# Patient Record
Sex: MALE | Race: WHITE
[De-identification: names, ages, dates, MRNs, and addresses within clinical notes are randomized per-mention and may not be internally consistent; named-entity substitution may affect disease eponyms.]

---
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
## 2023-10-25 ENCOUNTER — EMERGENCY (EMERGENCY)
Facility: HOSPITAL | Age: 18
LOS: 0 days | Discharge: HOME | End: 2023-10-25
Attending: STUDENT IN AN ORGANIZED HEALTH CARE EDUCATION/TRAINING PROGRAM
Payer: COMMERCIAL

## 2023-10-25 VITALS
DIASTOLIC BLOOD PRESSURE: 65 MMHG | WEIGHT: 235.01 LBS | HEART RATE: 93 BPM | SYSTOLIC BLOOD PRESSURE: 114 MMHG | OXYGEN SATURATION: 96 % | RESPIRATION RATE: 18 BRPM | TEMPERATURE: 99 F

## 2023-10-25 DIAGNOSIS — R22.33 LOCALIZED SWELLING, MASS AND LUMP, UPPER LIMB, BILATERAL: ICD-10-CM

## 2023-10-25 DIAGNOSIS — J02.9 ACUTE PHARYNGITIS, UNSPECIFIED: ICD-10-CM

## 2023-10-25 DIAGNOSIS — B09 UNSPECIFIED VIRAL INFECTION CHARACTERIZED BY SKIN AND MUCOUS MEMBRANE LESIONS: ICD-10-CM

## 2023-10-25 DIAGNOSIS — Z88.8 ALLERGY STATUS TO OTHER DRUGS, MEDICAMENTS AND BIOLOGICAL SUBSTANCES: ICD-10-CM

## 2023-10-25 DIAGNOSIS — R22.43 LOCALIZED SWELLING, MASS AND LUMP, LOWER LIMB, BILATERAL: ICD-10-CM

## 2023-10-25 PROCEDURE — 99283 EMERGENCY DEPT VISIT LOW MDM: CPT

## 2023-10-25 PROCEDURE — 99282 EMERGENCY DEPT VISIT SF MDM: CPT

## 2023-10-25 NOTE — ED STATDOCS - PATIENT PORTAL LINK FT
You can access the FollowMyHealth Patient Portal offered by [facility] by registering at the following website: [email or web address]. By joining [facility]’s FollowMyHealth portal, you will also be able to view your health information using other applications (apps) compatible with our system.

## 2023-10-25 NOTE — ED STATDOCS - NSFOLLOWUPINSTRUCTIONS_ED_ALL_ED_FT
FOLLOW UP WITH YOUR DERMATOLOGIST IN 1-2 DAYS. RETURN TO THE ER FOR ANY WORSENING SYMPTOMS OR NEW CONCERNS.     Hand, Foot, and Mouth Disease    WHAT YOU NEED TO KNOW:    What is hand, foot, and mouth disease (HFMD)? Hand, foot, and mouth disease (HFMD) is an infection caused by a virus. HFMD is easily spread from person to person through direct contact. Anyone can get HFMD, but it is most common in children younger than 5 years.  Hand Foot Mouth Disease    What are the signs and symptoms of HFMD? The following may appear 3 to 7 days after infection and normally go away within 7 to 10 days:    Fever    Sore throat    Lack of appetite    A rash, sores, or painful red blisters in or around the mouth, throat, hands, feet, or diaper area  How is HFMD diagnosed? Your healthcare provider can usually diagnose HFMD by examining you. Tell him or her if you have been near anyone who has HFMD. A provider may also swab your throat or nose, or collect a sample of your bowel movement. These samples will be sent to a lab to test for a virus that causes HFMD.    How is HFMD treated? HFMD usually goes away on its own without treatment. The following can help you feel more comfortable until your symptoms go away:    Drink extra liquids, as directed. Liquid will hep prevent dehydration. Ask your healthcare provider how much liquid to drink each day, and which liquids are best for you.    Have foods and liquids that are easy to swallow. Examples include cold foods such as popsicles, smoothies, or ice cream. Do not have sodas, hot drinks, or acidic foods such as tomato sauce or orange juice.    Medicines may help decrease a fever or pain. Your healthcare provider will tell you which medicines to use, and how long to use them. Do not give aspirin to children younger than 18 years. Aspirin can cause a life-threatening condition called Reye syndrome.  How do I prevent the spread of HFMD? You can spread the virus for weeks after your symptoms have gone away. The following can help prevent the spread of HFMD:    Wash your hands often. Use soap and water. Wash your hands after you use the bathroom, change a child's diapers, or sneeze. Wash your hands before you prepare or eat food.  Handwashing      Stay home from work or school while you have a fever or open blisters. Do not kiss, hug, or share food or drinks.    Wash all items and surfaces with diluted bleach. This includes toys, tables, counter tops, and door knobs.  When should I seek immediate care?    You have trouble breathing, are breathing very fast, or you cough up pink, foamy spit.    You have a high fever and your heart is beating much faster than it usually does.    You have a severe headache, stiff neck, and back pain.    You become confused and sleepy.    You have trouble moving, or cannot move part of your body.    You urinate less than normal or not at all.  When should I call my doctor?    Your mouth or throat are so sore you cannot eat or drink.    Your fever, sore throat, mouth sores, or rash do not go away after 10 days.    You have questions or concerns about your condition or care.  CARE AGREEMENT:    You have the right to help plan your care. Learn about your health condition and how it may be treated. Discuss treatment options with your healthcare providers to decide what care you want to receive. You always have the right to refuse treatment.    © [employer] 1973, 2023

## 2023-10-25 NOTE — ED STATDOCS - PHYSICAL EXAMINATION
GENERAL: A&Ox4, non-toxic appearing, no acute distress  HEENT: NCAT, EOMI, oral mucosa moist, normal conjunctiva. +Pharynx with mild erythema and palatal petechiae. No exudates.   RESP: no respiratory distress, speaking in full sentences  CV: RRR  MSK: no visible deformities  NEURO: no focal sensory or motor deficits, CN 2-12 grossly intact  SKIN: warm, normal color, well perfused. Papular blanching lesions to the palms, soles, and face. Scattered lesions on the feet appear open  PSYCH: normal affect

## 2023-10-25 NOTE — ED ADULT TRIAGE NOTE - CHIEF COMPLAINT QUOTE
pt presents to ED due to complaints of swelling in feet and rashes that started on within the last week pt seen by MD yesterday and started on meds

## 2023-10-25 NOTE — ED STATDOCS - NS ED ATTENDING STATEMENT MOD
This was a shared visit with the GEE. I reviewed and verified the documentation and independently performed the documented:

## 2023-10-25 NOTE — ED STATDOCS - OBJECTIVE STATEMENT
19 y/o male with no pertinent PMHx presents to the ED c/o bilateral feet swelling with pain to soles of feet x2 days and swelling of hands since last night, as well as mild sore throat and new papular rash to palms, soles, face, and scalp. He was recently seen at [facility] yesterday for upper respiratory symptoms with prescription for Amoxicillin at that time. Denies itching of feet. Denies sick contacts, young child exposure.

## 2023-10-25 NOTE — ED STATDOCS - CLINICAL SUMMARY MEDICAL DECISION MAKING FREE TEXT BOX
17 y/o male with 1 day of palmar and sole rash, appears consistent with hand/foot/mouth disease. Will give supportive treatment for home. Patient already prescribed oral antibiotics and Mupirocin. Advised to continue. 17 y/o male with 1 day of palmar and sole rash, appears consistent with hand/foot/mouth disease. Will give supportive treatment for home. Patient already prescribed oral antibiotics and Mupirocin. Advised to continue.    signed Christine Kebede PA-C Pt seen and evaluated initially in intake by Dr. Bermudez. 17 y/o male with 1 day of palmar and sole rash, appears consistent with hand/foot/mouth disease. Will give supportive treatment for home. Patient already prescribed oral antibiotics and Mupirocin. Advised to continue.    signed Christine Kebede PA-C Pt seen and evaluated initially in intake by Dr. Bermudez.  18M c/o painful lesions on hands, feet and face for a few days. pt with recent uri. Saw a doctor and derm in his PMD office yesterday and was started on amox. Pt alert, NAD, likely viral exanthem. Recommend NSAIDs, f/u with derm. May continue abx. Pt feeling well, pt and family agree with DC and plan of care. return precautions given

## 2023-10-25 NOTE — ED STATDOCS - ATTENDING APP SHARED VISIT CONTRIBUTION OF CARE
I, Andrey Bermudez DO, personally saw the patient with ACP.  I have personally performed a face to face diagnostic evaluation on this patient.  I have reviewed the ACP note and agree with the history, exam, and plan of care, except as noted.   The initial assessment was performed by myself and then the patient was handed off to the ACP. The patient was followed and re-evaluated by the ACP. All labs, imaging and procedures were evaluated and performed by the ACP and I was available for consultation if any questions in the patients care came up.
